# Patient Record
Sex: MALE | Race: WHITE | NOT HISPANIC OR LATINO
[De-identification: names, ages, dates, MRNs, and addresses within clinical notes are randomized per-mention and may not be internally consistent; named-entity substitution may affect disease eponyms.]

---

## 2020-11-25 ENCOUNTER — TRANSCRIPTION ENCOUNTER (OUTPATIENT)
Age: 60
End: 2020-11-25

## 2021-01-26 ENCOUNTER — OUTPATIENT (OUTPATIENT)
Dept: OUTPATIENT SERVICES | Facility: HOSPITAL | Age: 61
LOS: 1 days | End: 2021-01-26
Payer: COMMERCIAL

## 2021-01-26 ENCOUNTER — INPATIENT (INPATIENT)
Facility: HOSPITAL | Age: 61
LOS: 0 days | Discharge: HOME | DRG: 247 | End: 2021-01-27
Attending: HOSPITALIST | Admitting: HOSPITALIST
Payer: COMMERCIAL

## 2021-01-26 ENCOUNTER — TRANSCRIPTION ENCOUNTER (OUTPATIENT)
Age: 61
End: 2021-01-26

## 2021-01-26 VITALS
DIASTOLIC BLOOD PRESSURE: 95 MMHG | WEIGHT: 214.95 LBS | RESPIRATION RATE: 16 BRPM | OXYGEN SATURATION: 100 % | HEIGHT: 70 IN | HEART RATE: 92 BPM | SYSTOLIC BLOOD PRESSURE: 163 MMHG

## 2021-01-26 DIAGNOSIS — R07.9 CHEST PAIN, UNSPECIFIED: ICD-10-CM

## 2021-01-26 DIAGNOSIS — I25.9 CHRONIC ISCHEMIC HEART DISEASE, UNSPECIFIED: ICD-10-CM

## 2021-01-26 LAB
ANION GAP SERPL CALC-SCNC: 13 MMOL/L — SIGNIFICANT CHANGE UP (ref 5–17)
APTT BLD: 125.8 SEC — CRITICAL HIGH (ref 27.5–35.5)
APTT BLD: 34.2 SEC — SIGNIFICANT CHANGE UP (ref 27.5–35.5)
BASOPHILS # BLD AUTO: 0.05 K/UL — SIGNIFICANT CHANGE UP (ref 0–0.2)
BASOPHILS NFR BLD AUTO: 0.6 % — SIGNIFICANT CHANGE UP (ref 0–2)
BLD GP AB SCN SERPL QL: NEGATIVE — SIGNIFICANT CHANGE UP
BUN SERPL-MCNC: 17 MG/DL — SIGNIFICANT CHANGE UP (ref 7–23)
CALCIUM SERPL-MCNC: 9.3 MG/DL — SIGNIFICANT CHANGE UP (ref 8.4–10.5)
CHLORIDE SERPL-SCNC: 103 MMOL/L — SIGNIFICANT CHANGE UP (ref 96–108)
CO2 SERPL-SCNC: 25 MMOL/L — SIGNIFICANT CHANGE UP (ref 22–31)
CREAT SERPL-MCNC: 0.9 MG/DL — SIGNIFICANT CHANGE UP (ref 0.5–1.3)
EOSINOPHIL # BLD AUTO: 0.19 K/UL — SIGNIFICANT CHANGE UP (ref 0–0.5)
EOSINOPHIL NFR BLD AUTO: 2.1 % — SIGNIFICANT CHANGE UP (ref 0–6)
GLUCOSE SERPL-MCNC: 125 MG/DL — HIGH (ref 70–99)
HCT VFR BLD CALC: 44.5 % — SIGNIFICANT CHANGE UP (ref 39–50)
HGB BLD-MCNC: 14.6 G/DL — SIGNIFICANT CHANGE UP (ref 13–17)
IMM GRANULOCYTES NFR BLD AUTO: 0.2 % — SIGNIFICANT CHANGE UP (ref 0–1.5)
INR BLD: 1.06 — SIGNIFICANT CHANGE UP (ref 0.88–1.16)
LYMPHOCYTES # BLD AUTO: 1.83 K/UL — SIGNIFICANT CHANGE UP (ref 1–3.3)
LYMPHOCYTES # BLD AUTO: 20.2 % — SIGNIFICANT CHANGE UP (ref 13–44)
MCHC RBC-ENTMCNC: 30 PG — SIGNIFICANT CHANGE UP (ref 27–34)
MCHC RBC-ENTMCNC: 32.8 GM/DL — SIGNIFICANT CHANGE UP (ref 32–36)
MCV RBC AUTO: 91.4 FL — SIGNIFICANT CHANGE UP (ref 80–100)
MONOCYTES # BLD AUTO: 0.65 K/UL — SIGNIFICANT CHANGE UP (ref 0–0.9)
MONOCYTES NFR BLD AUTO: 7.2 % — SIGNIFICANT CHANGE UP (ref 2–14)
NEUTROPHILS # BLD AUTO: 6.32 K/UL — SIGNIFICANT CHANGE UP (ref 1.8–7.4)
NEUTROPHILS NFR BLD AUTO: 69.7 % — SIGNIFICANT CHANGE UP (ref 43–77)
NRBC # BLD: 0 /100 WBCS — SIGNIFICANT CHANGE UP (ref 0–0)
PLATELET # BLD AUTO: 197 K/UL — SIGNIFICANT CHANGE UP (ref 150–400)
POTASSIUM SERPL-MCNC: 4.5 MMOL/L — SIGNIFICANT CHANGE UP (ref 3.5–5.3)
POTASSIUM SERPL-SCNC: 4.5 MMOL/L — SIGNIFICANT CHANGE UP (ref 3.5–5.3)
PROTHROM AB SERPL-ACNC: 12.7 SEC — SIGNIFICANT CHANGE UP (ref 10.6–13.6)
RBC # BLD: 4.87 M/UL — SIGNIFICANT CHANGE UP (ref 4.2–5.8)
RBC # FLD: 12.2 % — SIGNIFICANT CHANGE UP (ref 10.3–14.5)
RH IG SCN BLD-IMP: POSITIVE — SIGNIFICANT CHANGE UP
SARS-COV-2 RNA SPEC QL NAA+PROBE: SIGNIFICANT CHANGE UP
SODIUM SERPL-SCNC: 141 MMOL/L — SIGNIFICANT CHANGE UP (ref 135–145)
TROPONIN T SERPL-MCNC: <0.01 NG/ML — SIGNIFICANT CHANGE UP (ref 0–0.01)
WBC # BLD: 9.06 K/UL — SIGNIFICANT CHANGE UP (ref 3.8–10.5)
WBC # FLD AUTO: 9.06 K/UL — SIGNIFICANT CHANGE UP (ref 3.8–10.5)

## 2021-01-26 PROCEDURE — 93571 IV DOP VEL&/PRESS C FLO 1ST: CPT | Mod: 26,LD

## 2021-01-26 PROCEDURE — 99285 EMERGENCY DEPT VISIT HI MDM: CPT

## 2021-01-26 PROCEDURE — 93458 L HRT ARTERY/VENTRICLE ANGIO: CPT | Mod: 26,59

## 2021-01-26 PROCEDURE — 99291 CRITICAL CARE FIRST HOUR: CPT

## 2021-01-26 PROCEDURE — 93306 TTE W/DOPPLER COMPLETE: CPT | Mod: 26

## 2021-01-26 PROCEDURE — 93010 ELECTROCARDIOGRAM REPORT: CPT

## 2021-01-26 PROCEDURE — 92941 PRQ TRLML REVSC TOT OCCL AMI: CPT | Mod: RC

## 2021-01-26 RX ORDER — TICAGRELOR 90 MG/1
1 TABLET ORAL
Qty: 60 | Refills: 0
Start: 2021-01-26 | End: 2021-02-24

## 2021-01-26 RX ORDER — ATORVASTATIN CALCIUM 80 MG/1
1 TABLET, FILM COATED ORAL
Qty: 0 | Refills: 0 | DISCHARGE
Start: 2021-01-26

## 2021-01-26 RX ORDER — DEXTROSE 50 % IN WATER 50 %
25 SYRINGE (ML) INTRAVENOUS ONCE
Refills: 0 | Status: DISCONTINUED | OUTPATIENT
Start: 2021-01-26 | End: 2021-01-27

## 2021-01-26 RX ORDER — DEXTROSE 50 % IN WATER 50 %
15 SYRINGE (ML) INTRAVENOUS ONCE
Refills: 0 | Status: DISCONTINUED | OUTPATIENT
Start: 2021-01-26 | End: 2021-01-27

## 2021-01-26 RX ORDER — GLUCAGON INJECTION, SOLUTION 0.5 MG/.1ML
1 INJECTION, SOLUTION SUBCUTANEOUS ONCE
Refills: 0 | Status: DISCONTINUED | OUTPATIENT
Start: 2021-01-26 | End: 2021-01-27

## 2021-01-26 RX ORDER — ASPIRIN/CALCIUM CARB/MAGNESIUM 324 MG
81 TABLET ORAL DAILY
Refills: 0 | Status: DISCONTINUED | OUTPATIENT
Start: 2021-01-27 | End: 2021-01-27

## 2021-01-26 RX ORDER — ATORVASTATIN CALCIUM 80 MG/1
80 TABLET, FILM COATED ORAL AT BEDTIME
Refills: 0 | Status: DISCONTINUED | OUTPATIENT
Start: 2021-01-27 | End: 2021-01-27

## 2021-01-26 RX ORDER — ASPIRIN/CALCIUM CARB/MAGNESIUM 324 MG
325 TABLET ORAL ONCE
Refills: 0 | Status: COMPLETED | OUTPATIENT
Start: 2021-01-26 | End: 2021-01-26

## 2021-01-26 RX ORDER — TICAGRELOR 90 MG/1
180 TABLET ORAL ONCE
Refills: 0 | Status: COMPLETED | OUTPATIENT
Start: 2021-01-26 | End: 2021-01-26

## 2021-01-26 RX ORDER — ASPIRIN/CALCIUM CARB/MAGNESIUM 324 MG
1 TABLET ORAL
Qty: 30 | Refills: 0
Start: 2021-01-26 | End: 2021-02-24

## 2021-01-26 RX ORDER — TICAGRELOR 90 MG/1
90 TABLET ORAL EVERY 12 HOURS
Refills: 0 | Status: DISCONTINUED | OUTPATIENT
Start: 2021-01-26 | End: 2021-01-27

## 2021-01-26 RX ORDER — SODIUM CHLORIDE 9 MG/ML
1000 INJECTION, SOLUTION INTRAVENOUS
Refills: 0 | Status: DISCONTINUED | OUTPATIENT
Start: 2021-01-26 | End: 2021-01-27

## 2021-01-26 RX ORDER — ATORVASTATIN CALCIUM 80 MG/1
80 TABLET, FILM COATED ORAL AT BEDTIME
Refills: 0 | Status: DISCONTINUED | OUTPATIENT
Start: 2021-01-26 | End: 2021-01-26

## 2021-01-26 RX ORDER — CHLORHEXIDINE GLUCONATE 213 G/1000ML
1 SOLUTION TOPICAL
Refills: 0 | Status: DISCONTINUED | OUTPATIENT
Start: 2021-01-26 | End: 2021-01-27

## 2021-01-26 RX ORDER — ENOXAPARIN SODIUM 100 MG/ML
40 INJECTION SUBCUTANEOUS EVERY 24 HOURS
Refills: 0 | Status: DISCONTINUED | OUTPATIENT
Start: 2021-01-27 | End: 2021-01-27

## 2021-01-26 RX ORDER — METOPROLOL TARTRATE 50 MG
25 TABLET ORAL EVERY 24 HOURS
Refills: 0 | Status: DISCONTINUED | OUTPATIENT
Start: 2021-01-26 | End: 2021-01-27

## 2021-01-26 RX ORDER — DEXTROSE 50 % IN WATER 50 %
12.5 SYRINGE (ML) INTRAVENOUS ONCE
Refills: 0 | Status: DISCONTINUED | OUTPATIENT
Start: 2021-01-26 | End: 2021-01-27

## 2021-01-26 RX ORDER — INSULIN LISPRO 100/ML
VIAL (ML) SUBCUTANEOUS
Refills: 0 | Status: DISCONTINUED | OUTPATIENT
Start: 2021-01-26 | End: 2021-01-27

## 2021-01-26 RX ADMIN — TICAGRELOR 180 MILLIGRAM(S): 90 TABLET ORAL at 14:00

## 2021-01-26 RX ADMIN — Medication 325 MILLIGRAM(S): at 14:00

## 2021-01-26 RX ADMIN — Medication 25 MILLIGRAM(S): at 22:07

## 2021-01-26 NOTE — DISCHARGE NOTE PROVIDER - HOSPITAL COURSE
Patient is a 60yr old male w/PMH of DM2 and HTN, who presented to [facility] ED after having a aching upper chest discomfort and VAHE in inferior leads. In the ED patient loaded with ASA, brillinta, and admitted for cardiac cath. While in cath patient noted to have 90% stenosis of pRCA and stent was placed in Proximal RCA (see below for remainder of report). admitted to CCU for recover. started on ASA, brillinta, c/w home statin and losartan. Echo performed showing     Cath reprot:  [facility]: LM normal ; LAD 60-70%, pLAD iFR 0.76 ; LCx 80% focal pLCx stenosis ; RCA 90% stenosis of pRCA (culprit lesion)  LV: EF 45-50%, LV EDP 5mmHg, inferior wall hypokinesis  PCI: TUNDE x1 pRCA  Qbfj-mo-ftjqqje time 45min    DM: ISS inpatient, SHERIFF/TLC diet, losartan  HTN: losartan  CAD/STEMI: s/p pRCA stent, ASA, brilinta, statin, toprol-xl  HLD: statin    New meds: asa 81, brillinta 90BID, toprol 25xl    New images: none    New labs: none   Patient is a 60yr old male w/PMH of DM2 and HTN, who presented to [facility] ED after having a aching upper chest discomfort and VAHE in inferior leads. In the ED patient loaded with ASA, brillinta, and admitted for cardiac cath. While in cath patient noted to have 90% stenosis of pRCA and stent was placed in Proximal RCA (see below for remainder of report). admitted to CCU for recover. started on ASA, brillinta, c/w home statin and losartan. Echo performed showing     Cath reprot:  [facility]: LM normal ; LAD 60-70%, pLAD iFR 0.76 ; LCx 80% focal pLCx stenosis ; RCA 90% stenosis of pRCA (culprit lesion)  LV: EF 45-50%, LV EDP 5mmHg, inferior wall hypokinesis  PCI: TUNDE x1 pRCA  Rdce-hq-ucjqmgp time 45min    DM: ISS inpatient, SHERIFF/TLC diet, losartan  HTN: losartan 100  CAD/STEMI: s/p pRCA stent, ASA, brilinta, statin, toprol-xl 25  HLD: statin    New meds: asa 81, brillinta 90BID, toprol 25xl    New images: none    New labs: none   Patient is a 60yr old male w/PMH of DM2 and HTN, who presented to [facility] ED after having a aching upper chest discomfort and VAHE in inferior leads. In the ED patient loaded with ASA, brillinta, and admitted for cardiac cath. While in cath patient noted to have 90% stenosis of pRCA and stent was placed in Proximal RCA (see below for remainder of report). admitted to CCU for recover. started on ASA, brillinta, c/w home statin and losartan. Echo performed showing EF 55% w/o any significant valvular disease and moderate concentric left ventricular hypertrophy    Cath reprot:  [facility]: LM normal ; LAD 60-70%, pLAD iFR 0.76 ; LCx 80% focal pLCx stenosis ; RCA 90% stenosis of pRCA (culprit lesion)  LV: EF 45-50%, LV EDP 5mmHg, inferior wall hypokinesis  PCI: TUNDE x1 pRCA  Mftt-tm-kynvvgo time 45min    DM: ISS inpatient, SHERIFF/TLC diet, losartan  HTN: losartan 100  CAD/STEMI: s/p pRCA stent, ASA, brilinta, statin, toprol-xl 25  HLD: statin    New meds: asa 81, brillinta 90BID, toprol 25xl    New images: none    New labs: none   Patient is a 60yr old male w/PMH of DM2 and HTN, who initially presented to [facility] for planned outpatient exercise stress test, however had aching upper chest discomfort and VAHE in III and aVF concerning for STEMI. Taken to ED where he was loaded with ASA and brillinta, then taken to cardiac cath (ivmd-xz-xxoqxxa time: 45minutes). LHC (R radial access) w/ 90% stenosis of pRCA (culprit lesion) and stent was placed in Proximal RCA (see below for remainder of report). admitted to CCU for further monitoring. Started ASA 81mg qd, brillinta 90mg q12hrs, toprol 25mg, continued home statin and losartan. Post-cath Echo performed showing EF 55% w/o any significant valvular disease and moderate concentric left ventricular hypertrophy. Also found to have significant disease in pLAD and RCA during LHC, plan for outpatient discussion with primary cardiologist regarding plan for staged PCI vs bypass.    Cath report:  [facility]: LM normal ; LAD 60-70%, pLAD iFR 0.76 ; LCx 80% focal pLCx stenosis ; RCA 90% stenosis of pRCA (culprit lesion)  LV: EF 45-50%, LV EDP 5mmHg, inferior wall hypokinesis  PCI: TUNDE x1 pRCA  Xged-mm-yrvxlrw time 45min    DM: ISS inpatient, SHERIFF/TLC diet, losartan  HTN: losartan 100  CAD/STEMI: s/p pRCA stent, ASA, brilinta, statin, toprol-xl 25  HLD: statin    New meds: asa 81, brillinta 90BID, toprol 25xl    New images: none    New labs: none

## 2021-01-26 NOTE — DISCHARGE NOTE PROVIDER - NSDCFUADDAPPT_GEN_ALL_CORE_FT
Please follow-up with Dr. Larson your cardiologist to establish what will be the next step in the treatment of your cardiac disease

## 2021-01-26 NOTE — ED PROVIDER NOTE - CLINICAL SUMMARY MEDICAL DECISION MAKING FREE TEXT BOX
61 y/o M sent in from outpatient stress lab for STEMI that occurred shortly after procedure. Currently appears well, asymptomatic, EKG with resolution of ST segment elevation. Cardiology at bedside, exam unremarkable, concern for unstable angina and ACS. Will give aspirin and Brilinta, admit to cath lab, check basic labs, EKG.

## 2021-01-26 NOTE — DISCHARGE NOTE PROVIDER - NSDCCPTREATMENT_GEN_ALL_CORE_FT
PRINCIPAL PROCEDURE  Procedure: Percutaneous coronary intervention, during STEMI  Findings and Treatment: Date of Procedure: 1/26/2021  Coronary Angiogram:  Three Vessel Coronary Artery Disease  Syntax score intermediate  Frailty score 4  Coronary Angiogram  LMCA: Normal  LAD: 60-70% stenosis of pLAD. iFR 0.76  D1: Mild luminal irregularities  LCx: 80% focal stenosis of pCIRC  OM1: Mild luminal irregularities  RCA: Large, dominant vessel with 90% stenosis of the proxRCA (Culprit Lesion)  Left Heart Catheterization  LV Gram: EF 45-50%; Inferior wall hypokinesis  LVEDP 5 mmHg  No AS, No MR  Intervention  - Successful PCI of 90% stenosis of pRCA with a 4.0 x 26 mm Resolute Ubly TUNDE. 0% residual stenosis and excellent angiographic result with BRUNILDA 3 flow post intervention.  - Door to Balloon Time 45 min      SECONDARY PROCEDURE  Procedure: Transthoracic echo  Findings and Treatment: Date of Study: 1/26/2021  CONCLUSIONS:   -Left Ventricle: There is moderate concentric left ventricular hypertrophy. The left ventricle is normal in size and systolic function with a calculated ejection fraction of 55%. There are no regional wall motion abnormalities seen. Analysis of mitral annular tissue Doppler, left atrial volume index, and tricuspid regurgitant velocity reveals: grade I diastolic dysfunction without elevated filling pressure.   -Right Ventricle: The right ventricle is normal in size. Right ventricular systolic function is normal. The tricuspid annularplane systolic excursion (TAPSE) is 18.00 mm (normal >=17 mm). RV tissue Doppler S' is 14.00 cm/s (normal >10 cm/s).   -No significant valvular disease.   -No evidence of pulmonary hypertension.   -No pericardial effusion.

## 2021-01-26 NOTE — DISCHARGE NOTE PROVIDER - CARE PROVIDER_API CALL
raza sultana  [address] [place] [address]  Phone: [phone]  Fax: (   )    -  Follow Up Time:    raza sultana  [address] [place] [address] between [facility] and third  [facility] level  Phone: [phone]  Fax: (   )    -  Scheduled Appointment: 02/08/2021 11:00 AM   Danika Moore  [address] ([facility])  Ground Level  Phone: [phone]  Fax: (   )    -  Scheduled Appointment: 02/08/2021 11:00 AM

## 2021-01-26 NOTE — DISCHARGE NOTE PROVIDER - NSDCMRMEDTOKEN_GEN_ALL_CORE_FT
atorvastatin 80 mg oral tablet: 1 tab(s) orally once a day (at bedtime)   Aspirin Enteric Coated 81 mg oral delayed release tablet: 1 tab(s) orally once a day  Crestor 40 mg oral tablet: 1 tab(s) orally once a day (at bedtime)   losartan 100 mg oral tablet: 1 tab(s) orally once a day  ticagrelor 90 mg oral tablet: 1 tab(s) orally every 12 hours  Toprol-XL 25 mg oral tablet, extended release: 1 tab(s) orally every 24 hours

## 2021-01-26 NOTE — ED PROVIDER NOTE - MUSCULOSKELETAL, MLM
Spine appears normal, range of motion is not limited, no muscle or joint tenderness. B/L UE pulses symmetrical, 2/2. No LE edema or calf tenderness.

## 2021-01-26 NOTE — ED ADULT NURSE NOTE - OBJECTIVE STATEMENT
Patient arrives via stretcher from stress lab due to EKG changes, STEMI code activated.  Patient denies chest pain or SOB at this time.  2L O2 in place via nasal cannula, 22g IV noted to L AC, new 20g IV placed in R AC, labs drawn, COVID swab performed, all sent to lab.  Dr. Navarrete at the bedside, STAT EKG completed.

## 2021-01-26 NOTE — ED PROVIDER NOTE - OBJECTIVE STATEMENT
59 y/o M with PMHx of HTN presenting from outpatient nuclear stress lab for evaluation of STEMI and chest pain that began shortly after stress test for routine purposes today approx 10min PTA. Patient reports chest pain has since resolved, substernal, nonradiating, pressure like. Currently symptom free. Cardiology accompanying patient at bedside, requesting stat registration, aspirin, Brilinta load, transfer to cath lab.

## 2021-01-26 NOTE — H&P ADULT - NSHPPHYSICALEXAM_GEN_ALL_CORE
GENERAL: NAD, lying in bed comfortably  HEAD:  Atraumatic, Normocephalic  EYES: EOMI, PERRLA, conjunctiva and sclera clear  ENT: Moist mucous membranes  NECK: Supple, No JVD  CHEST/LUNG: Clear to auscultation bilaterally; No rales, rhonchi, wheezing, or rubs. Unlabored respirations  HEART: Regular rate and rhythm; No murmurs, rubs, or gallops  ABDOMEN: BSx4; Soft, nontender, nondistended  EXTREMITIES:  2+ Peripheral Pulses, brisk capillary refill. No clubbing, cyanosis, or edema  NERVOUS SYSTEM:  A&Ox3, no focal deficits   SKIN: No rashes or lesions GENERAL: NAD, lying in bed comfortably  HEAD:  Atraumatic, Normocephalic  EYES: EOMI, PERRLA, conjunctiva and sclera clear  ENT: Moist mucous membranes  NECK: Supple, No JVD  CHEST/LUNG: Clear to auscultation bilaterally; No rales, rhonchi, wheezing, or rubs. Unlabored respirations  HEART: Regular rate and rhythm; No murmurs, rubs, or gallops  ABDOMEN: BSx4; Soft, nontender, nondistended  EXTREMITIES:  2+ Peripheral Pulses, brisk capillary refill. No clubbing, cyanosis, or edema, radial cuff in place, no active bleeding from cath site  NERVOUS SYSTEM:  A&Ox3, no focal deficits   SKIN: No rashes or lesions

## 2021-01-26 NOTE — H&P ADULT - NSHPLABSRESULTS_GEN_ALL_CORE
14.6   9.06  )-----------( 197      ( 26 Jan 2021 14:10 )             44.5 14.6   9.06  )-----------( 197      ( 26 Jan 2021 14:10 )             44.5    01-26    141  |  103  |  17  ----------------------------<  125<H>  4.5   |  25  |  0.90    Ca    9.3      26 Jan 2021 14:10

## 2021-01-26 NOTE — DISCHARGE NOTE PROVIDER - NSDCCPCAREPLAN_GEN_ALL_CORE_FT
PRINCIPAL DISCHARGE DIAGNOSIS  Diagnosis: ST elevation MI (STEMI)  Assessment and Plan of Treatment: An acute ST-elevation myocardial infarction (STEMI) is an event in which transmural myocardial ischemia results in myocardial injury or necrosis. This is also known as a Heart attack and can result in sudden cardiac death. In this admission we did a cardiac cath which showed 60-70% stenosis of your Left anterior decending, 80% in your proximal left circumflex artery, 90% right corona artery. Although these numbers may not make sense to you, it is improtant to let your primary care doctor and your cardialogist know. essentially these are major arteries which supply blood and oxygen to the heart and when they become blocked your heart muscles can die resulting in a heart attack. We placed a stent in your proximal right coronary artery as this was the lesion which caused your heart attack.  Please continue to take your asprin 81mg everyday along with your brilinta 90mg twice per day (every 12 hours). without these medications your new stents can close and another heart attack can ensue         PRINCIPAL DISCHARGE DIAGNOSIS  Diagnosis: ST elevation MI (STEMI)  Assessment and Plan of Treatment: An acute ST-elevation myocardial infarction (STEMI) is an event in which transmural myocardial ischemia results in myocardial injury or necrosis. This is also known as a Heart attack and can result in sudden cardiac death. In this admission we did a cardiac cath which showed 60-70% stenosis of your Left anterior decending, 80% in your proximal left circumflex artery, 90% right corona artery. Although these numbers may not make sense to you, it is improtant to let your primary care doctor and your cardialogist know. essentially these are major arteries which supply blood and oxygen to the heart and when they become blocked your heart muscles can die resulting in a heart attack. We placed a stent in your proximal right coronary artery as this was the lesion which caused your heart attack.  Please continue to take your asprin 81mg everyday along with your brilinta 90mg twice per day (every 12 hours). without these medications your new stents can close and another heart attack can ensue.  You will also be on a beta-blocker called metoprolol, this medication slows your heart rate and reduces how hard your heart has to work. Please continue to take metoprolol ___mg everyday.  You should continue your statin rouvastatin 40mg everyday.        SECONDARY DISCHARGE DIAGNOSES  Diagnosis: H/O echocardiogram  Assessment and Plan of Treatment: You had an echocardiogram performed this hosptialization. This test lets us take a look at your heart in real time. We saw _____    Diagnosis: HLD (hyperlipidemia)  Assessment and Plan of Treatment: You have high cholesterol. Cholesterol is a substance that is found in the blood. Everyone has some. It is needed for good health. The problem is, people sometimes have too much cholesterol. Compared with people with normal cholesterol, people with high cholesterol have a higher risk of heart attacks, strokes, and other health problems. The higher your cholesterol, the higher your risk of these problems. It is important to take your medications for high cholesterol as prescribed to prevent the complications of this condition.  please continue to take your crestor 40mg everyday.    Diagnosis: HTN (hypertension)  Assessment and Plan of Treatment: Hypertension is the medical term for high blood pressure. Blood pressure refers to the pressure that blood applies to the inner walls of the arteries. Arteries carry blood from the heart to other organs and parts of the body. Untreated high blood pressure increases the strain on the heart and arteries, eventually causing organ damage. High blood pressure increases the risk of heart failure, heart attack (myocardial infarction), stroke, and kidney failure. High blood pressure does not usually cause any symptoms. Treatment of hypertension usually begins with lifestyle changes. Making these lifestyle changes involves little or no risk. Recommended changes often include reducing the amount of salt in your diet, losing weight if you are overweight or obese, avoiding drinking too much alcohol, stopping smoking and exercising at least 30 minutes per day most days of the week. If you are prescribed medication for your hypertension it is important to take these as prescribed to prevent the possible complications of uncontrolled hypertension.  Please continue to take your losartan 100mg everyday when you leave the hosptial. This medication also helps reduce the progression of kidney injury in patients with diabetes.      Diagnosis: DM (diabetes mellitus), type 2  Assessment and Plan of Treatment: Type 2 diabetes (sometimes called type 2 "diabetes mellitus") is a disorder that disrupts the way your body uses sugar.  All the cells in your body need sugar to work normally. Sugar gets into the cells with the help of a hormone called insulin. If there is not enough insulin, or if the body stops responding to insulin, sugar builds up in the blood. That is what happens to people with diabetes.  Even though type 2 diabetes might not make you feel sick, it can cause serious problems over time, if it is not treated. The disorder can lead to heart attacks, strokes, kidney disease, vision problems (or even blindness), pain or loss of feeling in the hands and feet, the need to have fingers, toes, or other body parts removed (amputated).   Diabetes can be prevented. To reduce your chances of getting type 2 diabetes, the most important thing you can do is control your weight. If you already have the disorder, losing weight can improve your health and blood sugar control. Being active can also help prevent or control the disorder.  Please continue to take your metformin 500mg everyday. Please follow-up your A1c with your primary care provider       PRINCIPAL DISCHARGE DIAGNOSIS  Diagnosis: ST elevation MI (STEMI)  Assessment and Plan of Treatment: An acute ST-elevation myocardial infarction (STEMI) is an event in which transmural myocardial ischemia results in myocardial injury or necrosis. This is also known as a Heart attack and can result in sudden cardiac death. In this admission we did a cardiac cath which showed 60-70% stenosis of your Left anterior decending, 80% in your proximal left circumflex artery, 90% right corona artery. Although these numbers may not make sense to you, it is improtant to let your primary care doctor and your cardialogist know. essentially these are major arteries which supply blood and oxygen to the heart and when they become blocked your heart muscles can die resulting in a heart attack. We placed a stent in your proximal right coronary artery as this was the lesion which caused your heart attack.  Please continue to take your asprin 81mg everyday along with your brilinta 90mg twice per day (every 12 hours). without these medications your new stents can close and another heart attack can ensue.  You will also be on a beta-blocker called metoprolol, this medication slows your heart rate and reduces how hard your heart has to work. Please continue to take metoprolol XL 25mg everyday.  You should continue your statin rouvastatin 40mg everyday.        SECONDARY DISCHARGE DIAGNOSES  Diagnosis: H/O echocardiogram  Assessment and Plan of Treatment: You had an echocardiogram performed this hosptialization. This test lets us take a look at your heart in real time. We saw That you had good heart function with an EF of 55% and mild left ventircular hypertrophy. You also did not have any significant heart disease.    Diagnosis: HLD (hyperlipidemia)  Assessment and Plan of Treatment: You have high cholesterol. Cholesterol is a substance that is found in the blood. Everyone has some. It is needed for good health. The problem is, people sometimes have too much cholesterol. Compared with people with normal cholesterol, people with high cholesterol have a higher risk of heart attacks, strokes, and other health problems. The higher your cholesterol, the higher your risk of these problems. It is important to take your medications for high cholesterol as prescribed to prevent the complications of this condition.  please continue to take your crestor 40mg everyday.    Diagnosis: HTN (hypertension)  Assessment and Plan of Treatment: Hypertension is the medical term for high blood pressure. Blood pressure refers to the pressure that blood applies to the inner walls of the arteries. Arteries carry blood from the heart to other organs and parts of the body. Untreated high blood pressure increases the strain on the heart and arteries, eventually causing organ damage. High blood pressure increases the risk of heart failure, heart attack (myocardial infarction), stroke, and kidney failure. High blood pressure does not usually cause any symptoms. Treatment of hypertension usually begins with lifestyle changes. Making these lifestyle changes involves little or no risk. Recommended changes often include reducing the amount of salt in your diet, losing weight if you are overweight or obese, avoiding drinking too much alcohol, stopping smoking and exercising at least 30 minutes per day most days of the week. If you are prescribed medication for your hypertension it is important to take these as prescribed to prevent the possible complications of uncontrolled hypertension.  Please continue to take your losartan 100mg everyday when you leave the hosptial. This medication also helps reduce the progression of kidney injury in patients with diabetes.      Diagnosis: DM (diabetes mellitus), type 2  Assessment and Plan of Treatment: Type 2 diabetes (sometimes called type 2 "diabetes mellitus") is a disorder that disrupts the way your body uses sugar.  All the cells in your body need sugar to work normally. Sugar gets into the cells with the help of a hormone called insulin. If there is not enough insulin, or if the body stops responding to insulin, sugar builds up in the blood. That is what happens to people with diabetes.  Even though type 2 diabetes might not make you feel sick, it can cause serious problems over time, if it is not treated. The disorder can lead to heart attacks, strokes, kidney disease, vision problems (or even blindness), pain or loss of feeling in the hands and feet, the need to have fingers, toes, or other body parts removed (amputated).   Diabetes can be prevented. To reduce your chances of getting type 2 diabetes, the most important thing you can do is control your weight. If you already have the disorder, losing weight can improve your health and blood sugar control. Being active can also help prevent or control the disorder.  Please continue to take your metformin 500mg everyday. Please follow-up your A1c with your primary care provider       PRINCIPAL DISCHARGE DIAGNOSIS  Diagnosis: ST elevation MI (STEMI)  Assessment and Plan of Treatment: During your stress test, you began having chest/neck discomfort and changes on your EKG, and you were found to have a myocardial infarction. A myocardial infarction, otherwise known as a heart attack, is when there is blockage of the blood vessels that deliver oxygenated blood to feed the heart muscle. The blockage is caused by coronary artery disease, which is damage to the heart's blood vessels from a buildup of plaque. This causes coronary arteries to narrow, limiting blood flow to the heart.   To fix the blockage, 1 stent was ere placed in one of your coronary arteries called the "proximal RCA". During the procedure, you were found to have blockages of the “left anterior descending artery” and the “proximal left circumflex artery”. These blockages were not responsible for your heart attack.  You were started on medications to help prevent further build up and reduce your risk of another heart attack, which you will continue after you leave the hospital.  Instructions:  - Please follow up with your cardiologist. You will discuss your recent heart attack, as well as plans for intervention on the other coronary arteries that showed blockages.  - Take aspirin 81mg once daily, Brilinta (Ticagrelor) 90mg twice daily (12 hours apart), and rosuvastatin 40mg once daily. YOU MUST TAKE THESE MEDICATIONS EVERY DAY (UNLESS OTHERWISE INDICATED BY YOUR CARDIOLOGIST) TO PREVENT CLOTS FROM FORMING AND BLOCKING BLOOD FLOW TO THE HEART, WHICH CAN CAUSE A HEART ATTACK AND DEATH.  - Take Losartan 100mg every day. This medication treats high blood pressure which can further damage the coronary arteries.  - Take metoprolol succinate (ToprolXL) 25mg once daily. This medication slows the heart down and can lower blood pressure, which can help reduce the amount of work the heart has to do.  -You should refrain from strenuous activity and heavy lifting for 1 week.  - If you have severe chest pain or pressure, or shortness of breath, this may be a sign of another heart attack.      SECONDARY DISCHARGE DIAGNOSES  Diagnosis: H/O echocardiogram  Assessment and Plan of Treatment: [facility] This test lets us take a look at your heart in real time. We saw That you had good heart function with an EF of 55% and mild left ventircular hypertrophy. You also did not have any significant heart disease.    Diagnosis: HLD (hyperlipidemia)  Assessment and Plan of Treatment: You have high cholesterol. Cholesterol is a substance that is found in the blood. Everyone has some. It is needed for good health. The problem is, people sometimes have too much cholesterol. Compared with people with normal cholesterol, people with high cholesterol have a higher risk of heart attacks, strokes, and other health problems. The higher your cholesterol, the higher your risk of these problems. It is important to take your medications for high cholesterol as prescribed to prevent the complications of this condition.  Please continue to take your crestor 40mg everyday.    Diagnosis: HTN (hypertension)  Assessment and Plan of Treatment: Hypertension is the medical term for high blood pressure. Blood pressure refers to the pressure that blood applies to the inner walls of the arteries. Arteries carry blood from the heart to other organs and parts of the body. Untreated high blood pressure increases the strain on the heart and arteries, eventually causing organ damage. High blood pressure increases the risk of heart failure, heart attack (myocardial infarction), stroke, and kidney failure. High blood pressure does not usually cause any symptoms. Treatment of hypertension usually begins with lifestyle changes. Making these lifestyle changes involves little or no risk. Recommended changes often include reducing the amount of salt in your diet, losing weight if you are overweight or obese, avoiding drinking too much alcohol, stopping smoking and exercising at least 30 minutes per day most days of the week.  Please continue to take your losartan 100mg everyday when you leave the hosptial. This medication also helps reduce the progression of kidney injury in patients with diabetes.    Diagnosis: DM (diabetes mellitus), type 2  Assessment and Plan of Treatment: Type 2 diabetes (sometimes called type 2 "diabetes mellitus") is a disorder that disrupts the way your body uses sugar.  All the cells in your body need sugar to work normally. Sugar gets into the cells with the help of a hormone called insulin. If there is not enough insulin, or if the body stops responding to insulin, sugar builds up in the blood. That is what happens to people with diabetes.  Even though type 2 diabetes might not make you feel sick, it can cause serious problems over time, if it is not treated. The disorder can lead to heart attacks, strokes, kidney disease, vision problems (or even blindness), pain or loss of feeling in the hands and feet, the need to have fingers, toes, or other body parts removed (amputated).   Diabetes can be prevented. To reduce your chances of getting type 2 diabetes, the most important thing you can do is control your weight. If you already have the disorder, losing weight can improve your health and blood sugar control. Being active can also help prevent or control the disorder.  Please continue to take your metformin 500mg everyday. Please follow-up your A1c with your primary care provider.

## 2021-01-26 NOTE — H&P ADULT - ASSESSMENT
Plan:  60yr old male pmh of DM2 who presented to the ED due to STEMI during stress test eval    Nuero:  No active issues    Cardio:  STEMI: inferior leads  - ASA, brillinta, heparin in the ED  - c/w ASA 81, Brillinta 90BID, statin 80mg  - start b-blocker  - f/u formal echo    HTN:  -c/w losartan    Pulm: no active issues    GI: no acitive issues    : no acitve issues    Endo: no acitve issues    MSK: no acitve issues    Heme: lovenox PPX     Plan:  60yr old male pmh of DM2 who presented to the ED due to STEMI during stress test eval. found to have 90% RCA occlusion s/p 1 stent in pRCA    Nuero:  No active issues    Cardio:  STEMI: inferior leads s/p pRCA stent  - trop and CKMB neg but early presentation  - ASA, brillinta in ED  - c/w ASA 81, Brillinta 90BID, statin 80mg  - start b-blocker  - f/u formal echo    HTN:  -c/w losartan 100mg qd    HLD  - LDL 70 post statin initiation  - c/w statin    Pulm: no active issues    GI: no acitive issues    : no acitve issues    Endo:  DM  - FSS w/ISS  - c/w metformin at home 48hrs post procedure  - A1c 6.4 outpatient 1/21    MSK: remove radial band 2hrs post cath    Heme: lovenox PPX     Plan:  60yr old male pmh of DM2 who presented to the ED due to STEMI during stress test eval. found to have 90% RCA occlusion s/p 1 stent in pRCA    Nuero:  No active issues    Cardio:  STEMI: inferior leads s/p pRCA stent  - trop and CKMB neg but early presentation  - ASA, brillinta in ED  - c/w ASA 81, Brillinta 90BID, statin 80mg  - toprol-xl 25 q24  - f/u formal echo    HTN:  -can restart losartan 100mg qd pending cr tomorrow    HLD  - LDL 70 post statin initiation  - c/w statin    Pulm: no active issues    GI: no acitive issues    : no acitve issues    Endo:  DM  - FSS w/ISS  - c/w metformin at home 48hrs post procedure  - A1c 6.4 outpatient 1/21    MSK: remove radial band 2hrs post cath    Heme: lovenox PPX     Plan:  60yr old male pmh of DM2 who presented to the ED due to STEMI during stress test eval. found to have 90% RCA occlusion s/p 1 stent in pRCA    Nuero:  No active issues    Cardio:  STEMI: inferior leads s/p pRCA stent  - trop and CKMB neg but early presentation  - ASA, brillinta in ED  - c/w ASA 81, Brillinta 90BID, statin 80mg  - toprol-xl 25 q24  - Echo 1/26 w/EF55%, no valvular disease    HTN:  -can restart losartan 100mg qd pending cr tomorrow    HLD  - LDL 70 post statin initiation  - c/w statin    Pulm: no active issues    GI: no acitive issues    : no acitve issues    Endo:  DM  - FSS w/ISS  - c/w metformin at home 48hrs post procedure  - A1c 6.4 outpatient 1/21    MSK: remove radial band 2hrs post cath    Heme: lovenox PPX

## 2021-01-26 NOTE — DISCHARGE NOTE PROVIDER - PROVIDER TOKENS
FREE:[LAST:[kayy],FIRST:[raza],PHONE:[phone]],FAX:[(   )    -],ADDRESS:[[address] [place] [address]]] FREE:[LAST:[kayy],FIRST:[raza],PHONE:[phone]],FAX:[(   )    -],ADDRESS:[[address] [place] [address] [address] between [facility] and [address]  [address]],SCHEDULEDAPPT:[02/08/2021],SCHEDULEDAPPTTIME:[11:00 AM]] FREE:[LAST:[Oscar],FIRST:[Danika],PHONE:[phone]],FAX:[(   )    -],ADDRESS:[[address] [place] [address]],SCHEDULEDAPPT:[02/08/2021],SCHEDULEDAPPTTIME:[11:00 AM]]

## 2021-01-26 NOTE — H&P ADULT - HISTORY OF PRESENT ILLNESS
Patient is a 60yr old male w/PMH of DM2 and ___, who presented to [facility] after having chest pain and VAHE in inferior leads. Patients provider called ems and patient was brought to [facility] for cath. In the ED patient loaded with ASA, brillinta, and started on heparin drip and admitted for cardiac cath. While in cath patient noted to have ____ and stent was placed in _____    Meds: losartan, metformin and rosuvastatin  allergies:  PMH:  PSH:  FH:  Social:   Patient is a 60yr old male w/PMH of DM2 and ___, who presented to [facility] after having a crushing, non -radiating substernal chest pain and VAHE in inferior leads. Patients provider called ems and patient was brought to [facility]. In the ED patients chest pain had subsided, rpeat EKG with ____. loaded with ASA, brillinta, and started on heparin drip and admitted for cardiac cath. While in cath patient noted to have ____ and stent was placed in _____. Patient seen when in the CCU    Meds: losartan, metformin and rosuvastatin  allergies:  PMH:  PSH:  FH:  Social:   Patient is a 60yr old male w/PMH of DM2 and ___, who presented to [facility] ED after having a crushing, non-radiating substernal chest pain lasting ten minutes and VAHE in inferior leads. Patient was having outpatient stress testing when the pain started and was subsequently wheeled into the ED. In the ED patients chest pain had subsided, repeat EKG with ____. loaded with ASA, brillinta, and admitted for cardiac cath. While in cath patient noted to have ___ occlusion in RCA and stent was placed in Proximal RCA. Patient seen when in the CCU, denies any headache, dizzyness, sob, chest pain, palpitations, abdominal pain.    Meds: losartan, metformin and rosuvastatin  allergies:  PMH:  PSH:  FH:  Social:   Patient is a 60yr old male w/PMH of DM2 and ___, who presented to [facility] ED after having a crushing, non-radiating substernal chest pain lasting ten minutes and VAHE in inferior leads. Patient was having outpatient stress testing when the pain started and was subsequently wheeled into the ED. In the ED patients chest pain had subsided, repeat EKG with ____ but trop-t negative. loaded with ASA, brillinta, and admitted for cardiac cath. While in cath patient noted to have ___ occlusion in RCA and stent was placed in Proximal RCA. Patient seen when in the CCU, denies any headache, dizzyness, sob, chest pain, palpitations, abdominal pain.    Meds: losartan, metformin and rosuvastatin  allergies:  PMH:  PSH:  FH:  Social:   Patient is a 60yr old male w/PMH of DM2 and HTN, who presented to [facility] ED after having a aching upper chest discomfort and VAHE in inferior leads. Patient states that starting about 3-4 months ago he would get a heart burn like pain that was mostly located in his upper chest and throat when ever he was active or walking around. His pain was always associated with activity, never at rest and was usually resolved when he took a break and drank cold water. He established care with a PCP in december due to incidental findings of BP>200 at walk-in clinic while getting a covid test. At new pcp diagnosed with DM2, HTN and had cardiac work-up with calcium score in 91st percentile which in addition to his symptoms prompted the cardiac stress test. While at stress testing patient typical chest pain began along with acute ST-elevations, subsequently wheeled into the ED. In the ED patients chest pain had subsided, trop-t negative, but outpatient EKG provided showing ST-elevations in inferior leads. loaded with ASA, brillinta, and admitted for cardiac cath. While in cath patient noted to have 90% stenosis of pRCA and stent was placed in Proximal RCA (see below for remainder of report). Patient seen when in the CCU, denies any headache, dizzyness, sob, chest pain, palpitations, abdominal pain.    Meds: losartan 100qd, metformin 500qd and rosuvastatin  allergies: None  PMH:DM2, HTN  PSH:none  FH: Father  of MI in 60's, mother with quadruple bypass in 60's  of MI @70  Social: causal drinker 1-2 glasses wine per week, denies smoking   Patient is a 60yr old male w/PMH of DM2 and HTN, who presented to [facility] ED after having a aching upper chest discomfort and VAHE in inferior leads. Patient states that starting about 3-4 months ago he would get a heart burn like pain that was mostly located in his upper chest and throat when ever he was active or walking around. His pain was always associated with activity, never at rest and was usually resolved when he took a break and drank cold water. He established care with a PCP in december due to incidental findings of BP>200 at walk-in clinic while getting a covid test. At new pcp diagnosed with DM2, HTN and had cardiac work-up with calcium score in 91st percentile which in addition to his symptoms prompted the cardiac stress test. While at stress testing patient typical chest pain began along with acute ST-elevations, subsequently wheeled into the ED. In the ED patients chest pain had subsided, trop-t negative, but outpatient EKG provided showing ST-elevations in inferior leads. loaded with ASA, brillinta, and admitted for cardiac cath. While in cath patient noted to have 90% stenosis of pRCA and stent was placed in Proximal RCA (see below for remainder of report). Patient seen when in the CCU, denies any headache, dizzyness, sob, chest pain, palpitations, abdominal pain.    Meds: losartan 100qd, metformin 500qd and rosuvastatin  allergies: None  PMH:DM2, HTN  PSH:none  FH: Father  of MI in 60's, mother with quadruple bypass in 60's  of MI @70  Social: causal drinker 1-2 glasses wine per week, denies smoking     Patient is a 60yr old male w/PMH of DM2 and HTN, who presented to [facility] ED after having a aching upper chest discomfort and VAHE in inferior leads. Patient states that starting about 3-4 months ago he would get a heart burn like pain that was mostly located in his upper chest and throat when ever he was active or walking around. His pain was always associated with activity, never at rest and was usually resolved when he took a break and drank cold water. He established care with a PCP in december due to incidental findings of BP>200 at walk-in clinic while getting a covid test. At new pcp diagnosed with DM2, HTN and had cardiac work-up with calcium score in 91st percentile which in addition to his symptoms prompted the cardiac stress test. While at stress testing patient typical chest pain began along with acute ST-elevations, subsequently wheeled into the ED. In the ED patients chest pain had subsided, trop-t negative, but outpatient EKG provided showing ST-elevations in inferior leads. loaded with ASA, brillinta, and admitted for cardiac cath. While in cath patient noted to have 90% stenosis of pRCA and stent was placed in Proximal RCA (see below for remainder of report). Patient seen when in the CCU, denies any headache, dizzyness, sob, chest pain, palpitations, abdominal pain.    Cath reprot:  LHC: LM normal ; LAD 60-70%, pLAD iFR 0.76 ; LCx 80% focal pLCx stenosis ; RCA 90% stenosis of pRCA (culprit lesion)  LV: EF 45-50%, LV EDP 5mmHg, inferior wall hypokinesis  PCI: TUNDE x1 pRCA  [id number] time 45min      Meds: losartan 100qd, metformin 500qd and rosuvastatin  allergies: None  PMH:DM2, HTN  PSH:none  FH: Father  of MI in 60's, mother with quadruple bypass in 60's  of MI @70  Social: causal drinker 1-2 glasses wine per week, denies smoking

## 2021-01-27 ENCOUNTER — TRANSCRIPTION ENCOUNTER (OUTPATIENT)
Age: 61
End: 2021-01-27

## 2021-01-27 VITALS
RESPIRATION RATE: 18 BRPM | DIASTOLIC BLOOD PRESSURE: 98 MMHG | OXYGEN SATURATION: 99 % | HEART RATE: 67 BPM | SYSTOLIC BLOOD PRESSURE: 145 MMHG

## 2021-01-27 PROBLEM — I10 ESSENTIAL (PRIMARY) HYPERTENSION: Chronic | Status: ACTIVE | Noted: 2021-01-26

## 2021-01-27 LAB
A1C WITH ESTIMATED AVERAGE GLUCOSE RESULT: 6.4 % — HIGH (ref 4–5.6)
ALBUMIN SERPL ELPH-MCNC: 3.9 G/DL — SIGNIFICANT CHANGE UP (ref 3.3–5)
ALP SERPL-CCNC: 63 U/L — SIGNIFICANT CHANGE UP (ref 40–120)
ALT FLD-CCNC: 30 U/L — SIGNIFICANT CHANGE UP (ref 10–45)
ANION GAP SERPL CALC-SCNC: 13 MMOL/L — SIGNIFICANT CHANGE UP (ref 5–17)
AST SERPL-CCNC: 25 U/L — SIGNIFICANT CHANGE UP (ref 10–40)
BILIRUB SERPL-MCNC: 1.7 MG/DL — HIGH (ref 0.2–1.2)
BUN SERPL-MCNC: 16 MG/DL — SIGNIFICANT CHANGE UP (ref 7–23)
CALCIUM SERPL-MCNC: 9.2 MG/DL — SIGNIFICANT CHANGE UP (ref 8.4–10.5)
CHLORIDE SERPL-SCNC: 101 MMOL/L — SIGNIFICANT CHANGE UP (ref 96–108)
CHOLEST SERPL-MCNC: 116 MG/DL — SIGNIFICANT CHANGE UP
CO2 SERPL-SCNC: 25 MMOL/L — SIGNIFICANT CHANGE UP (ref 22–31)
CREAT SERPL-MCNC: 0.95 MG/DL — SIGNIFICANT CHANGE UP (ref 0.5–1.3)
ESTIMATED AVERAGE GLUCOSE: 137 MG/DL — HIGH (ref 68–114)
GLUCOSE SERPL-MCNC: 100 MG/DL — HIGH (ref 70–99)
HCT VFR BLD CALC: 43.3 % — SIGNIFICANT CHANGE UP (ref 39–50)
HDLC SERPL-MCNC: 39 MG/DL — LOW
HGB BLD-MCNC: 14.3 G/DL — SIGNIFICANT CHANGE UP (ref 13–17)
LIPID PNL WITH DIRECT LDL SERPL: 57 MG/DL — SIGNIFICANT CHANGE UP
MAGNESIUM SERPL-MCNC: 1.8 MG/DL — SIGNIFICANT CHANGE UP (ref 1.6–2.6)
MCHC RBC-ENTMCNC: 29.9 PG — SIGNIFICANT CHANGE UP (ref 27–34)
MCHC RBC-ENTMCNC: 33 GM/DL — SIGNIFICANT CHANGE UP (ref 32–36)
MCV RBC AUTO: 90.4 FL — SIGNIFICANT CHANGE UP (ref 80–100)
NON HDL CHOLESTEROL: 77 MG/DL — SIGNIFICANT CHANGE UP
NRBC # BLD: 0 /100 WBCS — SIGNIFICANT CHANGE UP (ref 0–0)
PHOSPHATE SERPL-MCNC: 3.6 MG/DL — SIGNIFICANT CHANGE UP (ref 2.5–4.5)
PLATELET # BLD AUTO: 189 K/UL — SIGNIFICANT CHANGE UP (ref 150–400)
POTASSIUM SERPL-MCNC: 4.3 MMOL/L — SIGNIFICANT CHANGE UP (ref 3.5–5.3)
POTASSIUM SERPL-SCNC: 4.3 MMOL/L — SIGNIFICANT CHANGE UP (ref 3.5–5.3)
PROT SERPL-MCNC: 6.9 G/DL — SIGNIFICANT CHANGE UP (ref 6–8.3)
RBC # BLD: 4.79 M/UL — SIGNIFICANT CHANGE UP (ref 4.2–5.8)
RBC # FLD: 12.3 % — SIGNIFICANT CHANGE UP (ref 10.3–14.5)
SODIUM SERPL-SCNC: 139 MMOL/L — SIGNIFICANT CHANGE UP (ref 135–145)
TRIGL SERPL-MCNC: 101 MG/DL — SIGNIFICANT CHANGE UP
TSH SERPL-MCNC: 2.07 UIU/ML — SIGNIFICANT CHANGE UP (ref 0.35–4.94)
WBC # BLD: 9.9 K/UL — SIGNIFICANT CHANGE UP (ref 3.8–10.5)
WBC # FLD AUTO: 9.9 K/UL — SIGNIFICANT CHANGE UP (ref 3.8–10.5)

## 2021-01-27 PROCEDURE — A9505: CPT

## 2021-01-27 PROCEDURE — 82962 GLUCOSE BLOOD TEST: CPT

## 2021-01-27 PROCEDURE — 78451 HT MUSCLE IMAGE SPECT SING: CPT

## 2021-01-27 PROCEDURE — 99239 HOSP IP/OBS DSCHRG MGMT >30: CPT

## 2021-01-27 RX ORDER — MAGNESIUM SULFATE 500 MG/ML
1 VIAL (ML) INJECTION ONCE
Refills: 0 | Status: COMPLETED | OUTPATIENT
Start: 2021-01-27 | End: 2021-01-27

## 2021-01-27 RX ORDER — METOPROLOL TARTRATE 50 MG
1 TABLET ORAL
Qty: 30 | Refills: 0
Start: 2021-01-27 | End: 2021-02-25

## 2021-01-27 RX ORDER — LOSARTAN POTASSIUM 100 MG/1
100 TABLET, FILM COATED ORAL DAILY
Refills: 0 | Status: DISCONTINUED | OUTPATIENT
Start: 2021-01-27 | End: 2021-01-27

## 2021-01-27 RX ORDER — ROSUVASTATIN CALCIUM 5 MG/1
1 TABLET ORAL
Qty: 30 | Refills: 0
Start: 2021-01-27 | End: 2021-02-25

## 2021-01-27 RX ORDER — LOSARTAN POTASSIUM 100 MG/1
1 TABLET, FILM COATED ORAL
Qty: 30 | Refills: 0
Start: 2021-01-27 | End: 2021-02-25

## 2021-01-27 RX ADMIN — TICAGRELOR 90 MILLIGRAM(S): 90 TABLET ORAL at 00:05

## 2021-01-27 RX ADMIN — LOSARTAN POTASSIUM 100 MILLIGRAM(S): 100 TABLET, FILM COATED ORAL at 11:00

## 2021-01-27 RX ADMIN — ENOXAPARIN SODIUM 40 MILLIGRAM(S): 100 INJECTION SUBCUTANEOUS at 07:13

## 2021-01-27 RX ADMIN — Medication 81 MILLIGRAM(S): at 11:03

## 2021-01-27 RX ADMIN — Medication 100 GRAM(S): at 11:00

## 2021-01-27 RX ADMIN — TICAGRELOR 90 MILLIGRAM(S): 90 TABLET ORAL at 11:01

## 2021-01-27 NOTE — CHART NOTE - NSCHARTNOTEFT_GEN_A_CORE
Indication for Catheterization: STEMI  Indication for Admission: STEMI    Coronary Angiogram:    Three Vessel Coronary Artery Disease  Syntax score intermediate  Frailty score 4    Coronary Angiogram  LMCA: Normal  LAD: 60-70% stenosis of pLAD. iFR 0.76  D1: Mild luminal irregularities  LCx: 80% focal stenosis of pCIRC  OM1: Mild luminal irregularities  RCA: Large, dominant vessel with 90% stenosis of the proxRCA (Culprit Lesion)    Left Heart Catheterization  LV Gram: EF 45-50%; Inferior wall hypokinesis  LVEDP 5 mmHg  No AS, No MR    Intervention  - Successful PCI of 90% stenosis of pRCA with a 4.0 x 26 mm Resolute Cas TUNDE. 0% residual stenosis and excellent angiographic result with BRUNILDA 3 flow post intervention.  - Door to Balloon Time 45 min    Radial band placed on Right Radial access site with adequate hemostasis prior to leaving the cath lab  No procedural complications      Recommendations:  Dual anti-platelet therapy with Aspirin 81mg daily and Brillinta 90 mg BID for at least 1 year after which Aspirin 81mg daily should be continued indefinitely  Optimal medical therapy as tolerated, including appropriate intensity statin, beta blocker and ACE/ARB if indicated  Risk factor modification and risk reduction strategies with lifestyle changes  Follow up with Dr Senia Kenny post discharge-- Pt to discuss bypass vs PCI for LCx and LAD lesions with his cardiologist.

## 2021-01-27 NOTE — PROGRESS NOTE ADULT - ASSESSMENT
Plan:  60yr old male pmh of DM2 who presented to the ED due to STEMI during stress test eval. found to have 90% RCA occlusion s/p 1 stent in pRCA    Nuero:  - No active issues    Cardio:  STEMI: inferior leads s/p pRCA stent  - trop and CKMB neg but early presentation  - ASA, brillinta in ED  - c/w ASA 81, Brillinta 90BID, statin 80mg  - toprol-xl 25 q24  - Echo 1/26 w/EF55%, no valvular disease    HTN:  -can restart losartan 100mg qd pending cr tomorrow    HLD  - LDL 70 post statin initiation  - c/w statin    Bradycardia:  - asymptomatic, c/w b-blocker    Pulm: no active issues    GI: no acitive issues    : no acitve issues    Endo:  DM  - FSS w/ISS  - c/w metformin at home 48hrs post procedure  - A1c 6.4 outpatient 1/21, f/u am    MSK: no active issues    Heme: lovenox PPX

## 2021-01-27 NOTE — DISCHARGE NOTE NURSING/CASE MANAGEMENT/SOCIAL WORK - PATIENT PORTAL LINK FT
You can access the FollowMyHealth Patient Portal offered by [facility] by registering at the following website: [email or web address]. By joining [employer]’s FollowMyHealth portal, you will also be able to view your health information using other applications (apps) compatible with our system.

## 2021-01-27 NOTE — PROGRESS NOTE ADULT - SUBJECTIVE AND OBJECTIVE BOX
INTERVAL HPI/OVERNIGHT EVENTS: Yony cardia to 45+ while sleeping.     SUBJECTIVE:   Patient seen and examined at bedside. No compoaints. deneis headahce, dizzyness, sob, chest pain, abdominal pain. Says beeping from bradycardia was annoying him all night. says only happens when he is sleeping    OBJECTIVE:    VITAL SIGNS:  ICU Vital Signs Last 24 Hrs  T(C): 36.8 (27 Jan 2021 05:40), Max: 36.9 (26 Jan 2021 15:25)  T(F): 98.3 (27 Jan 2021 05:40), Max: 98.5 (26 Jan 2021 15:25)  HR: 61 (27 Jan 2021 06:00) (53 - 94)  BP: 138/74 (27 Jan 2021 06:00) (131/64 - 180/81)  BP(mean): 102 (27 Jan 2021 06:00) (90 - 127)  ABP: --  ABP(mean): --  RR: 17 (27 Jan 2021 06:00) (12 - 21)  SpO2: 97% (27 Jan 2021 06:00) (95% - 100%)        01-26 @ 07:01  -  01-27 @ 07:00  --------------------------------------------------------  IN: 300 mL / OUT: 1300 mL / NET: -1000 mL      CAPILLARY BLOOD GLUCOSE      POCT Blood Glucose.: 97 mg/dL (27 Jan 2021 07:19)      PHYSICAL EXAM:    General: NAD  HEENT: NC/AT; PERRL, clear conjunctiva  Neck: Supple, no JVD  Respiratory: CTA b/l. No wheezes, rhonchi, rales, unlaborded respirations  Cardiovascular: +S1/S2; RRR, no rubs gallops or murmus  Abdomen: soft, NT/ND; +BS x4  Extremities: WWP, 2+ peripheral pulses b/l; no LE edema  Skin: normal color and turgor; no rash, radial guaze clean dry and intact  Neurological: mentating appropriatly moving all extremities spontaneously    MEDICATIONS:  MEDICATIONS  (STANDING):  aspirin enteric coated 81 milliGRAM(s) Oral daily  atorvastatin 80 milliGRAM(s) Oral at bedtime  chlorhexidine 4% Liquid 1 Application(s) Topical <User Schedule>  dextrose 40% Gel 15 Gram(s) Oral once  dextrose 5%. 1000 milliLiter(s) (50 mL/Hr) IV Continuous <Continuous>  dextrose 5%. 1000 milliLiter(s) (100 mL/Hr) IV Continuous <Continuous>  dextrose 50% Injectable 25 Gram(s) IV Push once  dextrose 50% Injectable 12.5 Gram(s) IV Push once  dextrose 50% Injectable 25 Gram(s) IV Push once  enoxaparin Injectable 40 milliGRAM(s) SubCutaneous every 24 hours  glucagon  Injectable 1 milliGRAM(s) IntraMuscular once  insulin lispro (ADMELOG) corrective regimen sliding scale   SubCutaneous Before meals and at bedtime  metoprolol succinate ER 25 milliGRAM(s) Oral every 24 hours  ticagrelor 90 milliGRAM(s) Oral every 12 hours    MEDICATIONS  (PRN):      ALLERGIES:  Allergies    No Known Allergies    Intolerances        LABS:                        14.6   9.06  )-----------( 197      ( 26 Jan 2021 14:10 )             44.5     01-26    141  |  103  |  17  ----------------------------<  125<H>  4.5   |  25  |  0.90    Ca    9.3      26 Jan 2021 14:10      PT/INR - ( 26 Jan 2021 14:10 )   PT: 12.7 sec;   INR: 1.06          PTT - ( 26 Jan 2021 17:35 )  PTT:125.8 sec      RADIOLOGY & ADDITIONAL TESTS: Reviewed.

## 2021-01-28 PROBLEM — Z00.00 ENCOUNTER FOR PREVENTIVE HEALTH EXAMINATION: Status: ACTIVE | Noted: 2021-01-28

## 2021-02-02 DIAGNOSIS — I25.10 ATHEROSCLEROTIC HEART DISEASE OF NATIVE CORONARY ARTERY WITHOUT ANGINA PECTORIS: ICD-10-CM

## 2021-02-02 DIAGNOSIS — I21.19 ST ELEVATION (STEMI) MYOCARDIAL INFARCTION INVOLVING OTHER CORONARY ARTERY OF INFERIOR WALL: ICD-10-CM

## 2021-02-02 DIAGNOSIS — R00.1 BRADYCARDIA, UNSPECIFIED: ICD-10-CM

## 2021-02-02 DIAGNOSIS — E11.9 TYPE 2 DIABETES MELLITUS WITHOUT COMPLICATIONS: ICD-10-CM

## 2021-02-02 DIAGNOSIS — E78.5 HYPERLIPIDEMIA, UNSPECIFIED: ICD-10-CM

## 2021-02-02 DIAGNOSIS — I10 ESSENTIAL (PRIMARY) HYPERTENSION: ICD-10-CM

## 2021-02-08 ENCOUNTER — APPOINTMENT (OUTPATIENT)
Dept: HEART AND VASCULAR | Facility: CLINIC | Age: 61
End: 2021-02-08
Payer: COMMERCIAL

## 2021-02-08 ENCOUNTER — NON-APPOINTMENT (OUTPATIENT)
Age: 61
End: 2021-02-08

## 2021-02-08 VITALS
WEIGHT: 209.99 LBS | HEIGHT: 68 IN | HEART RATE: 67 BPM | TEMPERATURE: 98.1 F | DIASTOLIC BLOOD PRESSURE: 80 MMHG | BODY MASS INDEX: 31.83 KG/M2 | SYSTOLIC BLOOD PRESSURE: 125 MMHG | OXYGEN SATURATION: 100 %

## 2021-02-08 DIAGNOSIS — Z78.9 OTHER SPECIFIED HEALTH STATUS: ICD-10-CM

## 2021-02-08 DIAGNOSIS — Z86.79 PERSONAL HISTORY OF OTHER DISEASES OF THE CIRCULATORY SYSTEM: ICD-10-CM

## 2021-02-08 DIAGNOSIS — I10 ESSENTIAL (PRIMARY) HYPERTENSION: ICD-10-CM

## 2021-02-08 DIAGNOSIS — Z95.5 PRESENCE OF CORONARY ANGIOPLASTY IMPLANT AND GRAFT: ICD-10-CM

## 2021-02-08 DIAGNOSIS — E78.00 PURE HYPERCHOLESTEROLEMIA, UNSPECIFIED: ICD-10-CM

## 2021-02-08 DIAGNOSIS — Z86.39 PERSONAL HISTORY OF OTHER ENDOCRINE, NUTRITIONAL AND METABOLIC DISEASE: ICD-10-CM

## 2021-02-08 DIAGNOSIS — I25.10 ATHEROSCLEROTIC HEART DISEASE OF NATIVE CORONARY ARTERY W/OUT ANGINA PECTORIS: ICD-10-CM

## 2021-02-08 PROCEDURE — 84443 ASSAY THYROID STIM HORMONE: CPT

## 2021-02-08 PROCEDURE — U0005: CPT

## 2021-02-08 PROCEDURE — 93005 ELECTROCARDIOGRAM TRACING: CPT

## 2021-02-08 PROCEDURE — 93000 ELECTROCARDIOGRAM COMPLETE: CPT

## 2021-02-08 PROCEDURE — 83735 ASSAY OF MAGNESIUM: CPT

## 2021-02-08 PROCEDURE — 36415 COLL VENOUS BLD VENIPUNCTURE: CPT

## 2021-02-08 PROCEDURE — C1874: CPT

## 2021-02-08 PROCEDURE — 86850 RBC ANTIBODY SCREEN: CPT

## 2021-02-08 PROCEDURE — 80061 LIPID PANEL: CPT

## 2021-02-08 PROCEDURE — 84100 ASSAY OF PHOSPHORUS: CPT

## 2021-02-08 PROCEDURE — 99214 OFFICE O/P EST MOD 30 MIN: CPT

## 2021-02-08 PROCEDURE — 85730 THROMBOPLASTIN TIME PARTIAL: CPT

## 2021-02-08 PROCEDURE — 80053 COMPREHEN METABOLIC PANEL: CPT

## 2021-02-08 PROCEDURE — 84484 ASSAY OF TROPONIN QUANT: CPT

## 2021-02-08 PROCEDURE — 85027 COMPLETE CBC AUTOMATED: CPT

## 2021-02-08 PROCEDURE — C1725: CPT

## 2021-02-08 PROCEDURE — C1894: CPT

## 2021-02-08 PROCEDURE — 86900 BLOOD TYPING SEROLOGIC ABO: CPT

## 2021-02-08 PROCEDURE — 83036 HEMOGLOBIN GLYCOSYLATED A1C: CPT

## 2021-02-08 PROCEDURE — 87635 SARS-COV-2 COVID-19 AMP PRB: CPT

## 2021-02-08 PROCEDURE — 86901 BLOOD TYPING SEROLOGIC RH(D): CPT

## 2021-02-08 PROCEDURE — 99072 ADDL SUPL MATRL&STAF TM PHE: CPT

## 2021-02-08 PROCEDURE — 82550 ASSAY OF CK (CPK): CPT

## 2021-02-08 PROCEDURE — 85025 COMPLETE CBC W/AUTO DIFF WBC: CPT

## 2021-02-08 PROCEDURE — 85610 PROTHROMBIN TIME: CPT

## 2021-02-08 PROCEDURE — C1769: CPT

## 2021-02-08 PROCEDURE — 93306 TTE W/DOPPLER COMPLETE: CPT

## 2021-02-08 PROCEDURE — 82553 CREATINE MB FRACTION: CPT

## 2021-02-08 PROCEDURE — C1887: CPT

## 2021-02-08 PROCEDURE — 99285 EMERGENCY DEPT VISIT HI MDM: CPT

## 2021-02-08 PROCEDURE — 80048 BASIC METABOLIC PNL TOTAL CA: CPT

## 2021-02-09 ENCOUNTER — APPOINTMENT (OUTPATIENT)
Dept: INTERNAL MEDICINE | Facility: CLINIC | Age: 61
End: 2021-02-09
Payer: COMMERCIAL

## 2021-02-09 PROCEDURE — 97802 MEDICAL NUTRITION INDIV IN: CPT | Mod: 95

## 2021-02-11 RX ORDER — LOSARTAN POTASSIUM 100 MG/1
100 TABLET, FILM COATED ORAL
Refills: 0 | Status: ACTIVE | COMMUNITY

## 2021-02-11 RX ORDER — ASPIRIN 81 MG
81 TABLET ORAL
Refills: 0 | Status: ACTIVE | COMMUNITY

## 2021-02-11 RX ORDER — TICAGRELOR 90 MG/1
90 TABLET ORAL
Refills: 0 | Status: ACTIVE | COMMUNITY

## 2021-02-11 RX ORDER — METFORMIN HYDROCHLORIDE 500 MG/1
500 TABLET, COATED ORAL
Refills: 0 | Status: ACTIVE | COMMUNITY

## 2021-02-11 RX ORDER — METOPROLOL SUCCINATE 25 MG/1
25 TABLET, EXTENDED RELEASE ORAL
Refills: 0 | Status: ACTIVE | COMMUNITY

## 2021-02-11 RX ORDER — ADHESIVE TAPE 3"X 2.3 YD
50 MCG TAPE, NON-MEDICATED TOPICAL
Refills: 0 | Status: ACTIVE | COMMUNITY

## 2021-02-11 RX ORDER — ROSUVASTATIN CALCIUM 40 MG/1
40 TABLET, FILM COATED ORAL
Refills: 0 | Status: ACTIVE | COMMUNITY

## 2021-02-13 PROBLEM — I10 HYPERTENSION: Status: ACTIVE | Noted: 2021-02-11

## 2021-02-13 PROBLEM — Z86.39 HISTORY OF HYPERLIPIDEMIA: Status: RESOLVED | Noted: 2021-02-13 | Resolved: 2021-02-13

## 2021-02-13 PROBLEM — Z86.79 HISTORY OF HYPERTENSION: Status: RESOLVED | Noted: 2021-02-13 | Resolved: 2021-02-13

## 2021-02-13 PROBLEM — Z95.5 S/P CORONARY ARTERY STENT PLACEMENT: Status: ACTIVE | Noted: 2021-02-11

## 2021-02-13 PROBLEM — I25.10 CAD (CORONARY ARTERY DISEASE): Status: ACTIVE | Noted: 2021-02-11

## 2021-02-13 PROBLEM — Z78.9 NON-SMOKER: Status: ACTIVE | Noted: 2021-02-13

## 2021-02-13 PROBLEM — E78.00 HYPERCHOLESTEREMIA: Status: ACTIVE | Noted: 2021-02-11

## 2021-02-13 NOTE — PHYSICAL EXAM
[General Appearance - Well Developed] : well developed [Normal Appearance] : normal appearance [Well Groomed] : well groomed [General Appearance - Well Nourished] : well nourished [General Appearance - In No Acute Distress] : no acute distress [No Deformities] : no deformities [Normal Conjunctiva] : the conjunctiva exhibited no abnormalities [Eyelids - No Xanthelasma] : the eyelids demonstrated no xanthelasmas [Normal Oral Mucosa] : normal oral mucosa [No Oral Pallor] : no oral pallor [No Oral Cyanosis] : no oral cyanosis [Normal Jugular Venous A Waves Present] : normal jugular venous A waves present [Normal Jugular Venous V Waves Present] : normal jugular venous V waves present [No Jugular Venous Ribera A Waves] : no jugular venous ribera A waves [Heart Sounds] : normal S1 and S2 [Heart Rate And Rhythm] : heart rate and rhythm were normal [Murmurs] : no murmurs present [Exaggerated Use Of Accessory Muscles For Inspiration] : no accessory muscle use [Respiration, Rhythm And Depth] : normal respiratory rhythm and effort [Auscultation Breath Sounds / Voice Sounds] : lungs were clear to auscultation bilaterally [Abdomen Soft] : soft [Abdomen Tenderness] : non-tender [Abdomen Mass (___ Cm)] : no abdominal mass palpated [Gait - Sufficient For Exercise Testing] : the gait was sufficient for exercise testing [Abnormal Walk] : normal gait [Nail Clubbing] : no clubbing of the fingernails [Cyanosis, Localized] : no localized cyanosis [Petechial Hemorrhages (___cm)] : no petechial hemorrhages [Skin Color & Pigmentation] : normal skin color and pigmentation [] : no rash [No Venous Stasis] : no venous stasis [Skin Lesions] : no skin lesions [No Skin Ulcers] : no skin ulcer [No Xanthoma] : no  xanthoma was observed [Oriented To Time, Place, And Person] : oriented to person, place, and time [Affect] : the affect was normal [No Anxiety] : not feeling anxious [Mood] : the mood was normal

## 2021-03-08 ENCOUNTER — APPOINTMENT (OUTPATIENT)
Dept: INTERNAL MEDICINE | Facility: CLINIC | Age: 61
End: 2021-03-08
Payer: COMMERCIAL

## 2021-03-08 VITALS — WEIGHT: 203 LBS | HEIGHT: 68 IN | BODY MASS INDEX: 30.77 KG/M2

## 2021-03-08 DIAGNOSIS — E11.9 TYPE 2 DIABETES MELLITUS W/OUT COMPLICATIONS: ICD-10-CM

## 2021-03-08 PROCEDURE — 97803 MED NUTRITION INDIV SUBSEQ: CPT | Mod: 95

## 2021-03-12 NOTE — ASSESSMENT
[FreeTextEntry1] : Mr. Newsome is a 60 year old male with HTN, HLD, recent infeior STEMI who presents for follow up. He is doing well since discharge and tolerating his medications well. He is on aspirin, Brilanta and rosuvastatin. He is also on Metoprolol and Losartan for HTN. At this point the plan is to treat his residual CAD medically. He has made significant changes to his diet and life style as well. I will refer him to Dr. Serrano for diabetes management.

## 2021-03-12 NOTE — REASON FOR VISIT
[Follow-Up - From Hospitalization] : follow-up of a recent hospitalization for [FreeTextEntry1] : Mr. Newsome is a 60 year old with DM, HTN, HLD who presented for a stress test to [facility] and was found to have acute inferior STEMI during the stress test, and underwent emergent revascularization of his RCA. He was noted to have diffuse moderate disease of  LAD and focal high grade disease in LCX, both of which are being managed medically at this time. He is on aspirin and Brilanta and reports tolerating those well. He denies any chest pain, shortness of breath, orthopnea, PND or any other symptoms.

## 2021-08-09 ENCOUNTER — APPOINTMENT (OUTPATIENT)
Dept: HEART AND VASCULAR | Facility: CLINIC | Age: 61
End: 2021-08-09

## 2024-10-27 NOTE — DISCHARGE NOTE NURSING/CASE MANAGEMENT/SOCIAL WORK - NSPROEXTENSIONSOFSELF_GEN_A_NUR
contact lenses/eyeglasses + Fever, cough, sore throat, malaise consistent with viral illness.    Patient Not pregnant, from SNF, chronically ill or immunosuppressed.    Given History and Exam I have a lower suspicion for:  Emergent CardioPulmonary causes [such as Acute Asthma or COPD Exacerbation, acute Heart Failure or exacerbation, PE, PTX, atypical ACS, PNA].  Emergent Otolaryngeal causes [such as PTA, RPA, Ludwigs, Epiglottitis, EBV].  Emergent Travel or Immunosuppressive related infectious causes[such as acute HIV, SARS, MERS].

## 2025-02-27 NOTE — PATIENT PROFILE ADULT - NSTRANSFEREYEGLASSESPAIRS_GEN_A_NUR
Render In Strict Bullet Format?: No
Initiate Treatment: Apply fluocinonide 0.05 % topical ointment twice daily to fingers \\nWear white cotton gloves at night\\nWear gloves when washing dishes
Detail Level: Zone
Plan: If you get a blister, take a photo and call the office for a work in appointment
1 pair